# Patient Record
(demographics unavailable — no encounter records)

---

## 2025-03-20 NOTE — DISCUSSION/SUMMARY
[FreeTextEntry1] : Impression: 1) Seizures, well controlled on Keppra 500mg BID. Recent EEG in June 2022 normal. 2) Migraines, better than in the past. Managed with Advil and occasional triptan  Plan: 1)

## 2025-03-20 NOTE — HISTORY OF PRESENT ILLNESS
[FreeTextEntry1] : 3/20/25 HPI: Christine is a 55 year old female presenting for a follow up visit for seizures and migraines.  Nurtec prn for migraines   Meds: Keppra 500mg BID ---------------------------- Last seen 3/2/23: Seizures well controlled on Keppra 500mg BID. No seizures in >10 years. EEG June 2022 normal.  Still complains of headaches. Has 1-2 HA days per month, manages them with Advil. Takes Almotriptan maybe 3 times per year, notes it makes her sleepy. Feels like she has to "just plow through", and doesn't have time. Notes nausea and sound sensitivity with headaches. Tried magnesium in the past but unsure if it was helpful.  Notes weight gain of around 15lbs gradually over the past 2 years. Says she eats relatively healthy but is not exercising. 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  Background: No seizures in 11 years. She continues on levetiracteam/keppra ER 500mg x 2 = 1000 for seizures. Had been a higher dose in the past. 3 total in her life all off medications, onset age 30yo, then stopped medications during pregnancies.  Headaches still occur, seemingly related to lack of sleep and increased stress. She has tried advil 400mg and excedrin early, with some effect, but not full effect. Has trouble differentiating between headaches that will dissipate or not. There is also a daily sense of tightness in her head, that feels like pressure in the forehead or sinuses. Sonophobia, but no PP or osmophobia. Some nausea, no vomiting.